# Patient Record
Sex: FEMALE | Race: WHITE | URBAN - METROPOLITAN AREA
[De-identification: names, ages, dates, MRNs, and addresses within clinical notes are randomized per-mention and may not be internally consistent; named-entity substitution may affect disease eponyms.]

---

## 2023-08-18 ENCOUNTER — OFFICE VISIT (OUTPATIENT)
Dept: GYNECOLOGIC ONCOLOGY | Age: 63
End: 2023-08-18
Payer: COMMERCIAL

## 2023-08-18 VITALS
BODY MASS INDEX: 32.49 KG/M2 | HEIGHT: 65 IN | OXYGEN SATURATION: 99 % | WEIGHT: 195 LBS | SYSTOLIC BLOOD PRESSURE: 128 MMHG | HEART RATE: 99 BPM | DIASTOLIC BLOOD PRESSURE: 81 MMHG

## 2023-08-18 DIAGNOSIS — Z85.3 HISTORY OF BREAST CANCER: ICD-10-CM

## 2023-08-18 DIAGNOSIS — N83.202 CYST OF LEFT OVARY: Primary | ICD-10-CM

## 2023-08-18 DIAGNOSIS — R32 URINARY INCONTINENCE, UNSPECIFIED TYPE: ICD-10-CM

## 2023-08-18 PROCEDURE — 99205 OFFICE O/P NEW HI 60 MIN: CPT | Performed by: OBSTETRICS & GYNECOLOGY

## 2023-08-18 RX ORDER — INSULIN LISPRO 100 [IU]/ML
20 INJECTION, SOLUTION INTRAVENOUS; SUBCUTANEOUS
COMMUNITY

## 2023-08-18 RX ORDER — ASPIRIN 81 MG/1
81 TABLET ORAL DAILY
COMMUNITY
Start: 2021-06-28

## 2023-08-18 RX ORDER — PANTOPRAZOLE SODIUM 40 MG/1
40 TABLET, DELAYED RELEASE ORAL 2 TIMES DAILY
COMMUNITY
Start: 2023-07-20

## 2023-08-18 RX ORDER — LISINOPRIL 10 MG/1
10 TABLET ORAL DAILY
COMMUNITY
Start: 2023-07-25

## 2023-08-18 RX ORDER — INSULIN DEGLUDEC INJECTION 100 U/ML
INJECTION, SOLUTION SUBCUTANEOUS
COMMUNITY
Start: 2023-08-10

## 2023-08-18 RX ORDER — EPINEPHRINE 0.3 MG/.3ML
INJECTION SUBCUTANEOUS
COMMUNITY
Start: 2011-10-15

## 2023-08-18 RX ORDER — ATORVASTATIN CALCIUM 40 MG/1
40 TABLET, FILM COATED ORAL
COMMUNITY
Start: 2009-01-01

## 2023-08-18 ASSESSMENT — ENCOUNTER SYMPTOMS
VOMITING: 1
EYES NEGATIVE: 1
ABDOMINAL PAIN: 1
NAUSEA: 1
DIARRHEA: 1
RESPIRATORY NEGATIVE: 1
CONSTIPATION: 1

## 2023-08-18 NOTE — PROGRESS NOTES
Mayra Fernandes is a 60 y.o. female that presents today for:  Chief Complaint   Patient presents with    New Patient     Patient states she has a left adnexal mass. HPI:  HPI  60 y.o. She is here as a new patient for a left adnexal mass. TVUS showed 6.6 cm left adnexal mass. She denies any pain or symptoms from the cyst. She does have some GI symptoms with n/v that she follows with her primary care for. She has an abdominal aortic anyeurism. She states that the only gynecological symptom she has is some mild urinary incontinence. She had a bladder lift at the time of her hysterectomy in  1990's and states that it worked really well until just a few years ago. She has a history of breast cancer 8 years ago that required surgery/chemo/radiation but has had no issues since. ROS:  Review of Systems  Review of Systems   Constitutional: Negative. HENT:  Negative. Eyes: Negative. Respiratory: Negative. Cardiovascular: Negative. Gastrointestinal:  Positive for abdominal pain, constipation, diarrhea, nausea and vomiting. Endocrine: Negative. Genitourinary:  Positive for bladder incontinence. Musculoskeletal: Negative. Skin: Negative. Neurological: Negative. Hematological: Negative. No past medical history on file. No past surgical history on file. No family history on file. Social History     Socioeconomic History    Marital status:      Spouse name: None    Number of children: None    Years of education: None    Highest education level: None   Tobacco Use    Smoking status: Former     Types: Cigarettes    Smokeless tobacco: Never   Substance and Sexual Activity    Alcohol use: Yes     Comment: socially       Past [facility] does contribute to today's presenting complaint.       Current Outpatient Medications   Medication Sig Dispense Refill    aspirin 81 MG EC tablet 1 tablet daily      atorvastatin (LIPITOR) 40 MG tablet Take 1 tablet by mouth

## 2023-08-18 NOTE — PROGRESS NOTES
Review of Systems   Constitutional: Negative. HENT:  Negative. Eyes: Negative. Respiratory: Negative. Cardiovascular: Negative. Gastrointestinal:  Positive for abdominal pain, constipation, diarrhea, nausea and vomiting. Endocrine: Negative. Genitourinary:  Positive for bladder incontinence. Musculoskeletal: Negative. Skin: Negative. Neurological: Negative. Hematological: Negative.

## 2023-08-22 DIAGNOSIS — N83.202 CYST OF LEFT OVARY: ICD-10-CM

## 2023-08-29 ENCOUNTER — TELEPHONE (OUTPATIENT)
Dept: GYNECOLOGIC ONCOLOGY | Age: 63
End: 2023-08-29

## 2023-08-29 NOTE — TELEPHONE ENCOUNTER
Pt called stating she needs to have MRI orders faxed to [facility]. Writer faxed order, demo, INS, and LOV notes.

## 2023-09-14 DIAGNOSIS — N83.202 CYST OF LEFT OVARY: ICD-10-CM

## 2023-09-19 ENCOUNTER — PATIENT MESSAGE (OUTPATIENT)
Dept: GYNECOLOGIC ONCOLOGY | Age: 63
End: 2023-09-19

## 2023-09-20 NOTE — PROGRESS NOTES
Talked to patient about MRI results  Want her to get PET scan ASAP  She wants to schedule surgery asap  Robotic BSO this fall  If I don't have time, maybe Dr Darrin Hernandez could add her on her schedule?   Patient wants surgery asap    cvl

## 2023-09-20 NOTE — TELEPHONE ENCOUNTER
Called patient and notified provider is currently not in the office. Results have been routed to him for review. Notified to call office back if she has not heard from him by tomorrow. She voiced understanding.

## 2023-09-20 NOTE — TELEPHONE ENCOUNTER
From: Ameya Chirinos  To: Dr. Yu Krueger: 9/19/2023 5:10 PM EDT  Subject: MRI Pelvis Results    Hello! I had a MRI Pelvis last Thursday, 8/14. I haven't heard the results yet. My fingernails are down to the quick. Cherene Pummel Cherene Pummel Cherene Pummel Cherene Pummel

## 2023-09-25 DIAGNOSIS — Z85.3 HISTORY OF BREAST CANCER: ICD-10-CM

## 2023-09-26 ENCOUNTER — TELEPHONE (OUTPATIENT)
Dept: GYNECOLOGIC ONCOLOGY | Age: 63
End: 2023-09-26

## 2023-09-26 DIAGNOSIS — R94.8 ABNORMAL POSITRON EMISSION TOMOGRAPHY (PET) SCAN: ICD-10-CM

## 2023-09-26 DIAGNOSIS — Z85.3 HISTORY OF BREAST CANCER: Primary | ICD-10-CM

## 2023-09-26 DIAGNOSIS — R19.00 PELVIC MASS: ICD-10-CM

## 2023-09-26 NOTE — TELEPHONE ENCOUNTER
Called and informed patient of PET scan and biopsy orders. Patient states she feels plans will change when surgeon sees CTA results. Writer states will inform patient if plans change. Patient also requests that surgery scheduler to contact her prior to setting surgery date in [place] due to wanting a choice of surgery date. Writer voiced understanding and made surgery scheduler and supervisor aware of patient's choice. Patient voiced understanding and appreciation.

## 2023-09-28 ENCOUNTER — TELEPHONE (OUTPATIENT)
Dept: GYNECOLOGIC ONCOLOGY | Age: 63
End: 2023-09-28

## 2023-09-28 NOTE — TELEPHONE ENCOUNTER
Spoke with patient in regards to lymph node bx. Patient states she wants to wait and see what he pathology is on the pelvic mass before making decision on lymph node bx. Encouraged patient to call with any concerns. Patient voiced understanding and appreciation.

## 2023-10-02 ENCOUNTER — TELEPHONE (OUTPATIENT)
Dept: GYNECOLOGIC ONCOLOGY | Age: 63
End: 2023-10-02

## 2023-10-06 ENCOUNTER — TELEPHONE (OUTPATIENT)
Dept: GYNECOLOGIC ONCOLOGY | Age: 63
End: 2023-10-06

## 2023-10-06 NOTE — TELEPHONE ENCOUNTER
Patient called wanting to get a definitive date for surgery prior to seeing Dr. Pat Combs on 10/13     Discussed with patient some potential dates available, patient became upset wanting definitve dates, spoke with patient earlier in the week regarding dates for surgery, offered several dates  and she declined as she was going to be out of town. Patient said she would be available for surgery the 1st week in Dec. We planned to discuss further when she came in office to meet Dr. Pat Combs. I offered patietn to speak with Pauline patient told me that would not be necessary  Patient abruptly told me \"to take her name off the list for surgery\", she was going elsewhere and hung up the phone.

## 2023-10-09 ENCOUNTER — TELEPHONE (OUTPATIENT)
Dept: GYNECOLOGIC ONCOLOGY | Age: 63
End: 2023-10-09

## 2023-10-09 NOTE — TELEPHONE ENCOUNTER
Called pt and discussed my chart message. Explained Dr. Brian Rice surgery schedule and next available dates. Discussed patient previous decision to have surgery after returning form vacation in November. Pt states she changed her mind on 10/6/23 and wanted to have done sooner. Notified patient that she could keep appt on 10/13/23 with  and could schedule next available date 10/24/23 or 10/25/23 or sooner if robot was available. Pt declined. Reports that she already cancelled appt on 10/13/23. Offered for Dr. Brian Rice or Dr. Geni Sanchez to call her to discuss she declines.

## 2023-10-10 ENCOUNTER — TELEPHONE (OUTPATIENT)
Dept: INTERVENTIONAL RADIOLOGY/VASCULAR | Age: 63
End: 2023-10-10

## 2023-10-10 NOTE — TELEPHONE ENCOUNTER
Attempted to schedule pt for biopsy. Pt stated that she is unsure if she wants to have this completed. Call back number given.